# Patient Record
Sex: MALE | NOT HISPANIC OR LATINO | URBAN - METROPOLITAN AREA
[De-identification: names, ages, dates, MRNs, and addresses within clinical notes are randomized per-mention and may not be internally consistent; named-entity substitution may affect disease eponyms.]

---

## 2024-10-11 ENCOUNTER — APPOINTMENT (OUTPATIENT)
Dept: UROLOGY | Facility: CLINIC | Age: 69
End: 2024-10-11

## 2024-10-11 VITALS — TEMPERATURE: 97 F

## 2024-10-11 DIAGNOSIS — R39.12 BENIGN PROSTATIC HYPERPLASIA WITH WEAK URINARY STREAM: ICD-10-CM

## 2024-10-11 DIAGNOSIS — R97.20 ELEVATED PSA: Primary | ICD-10-CM

## 2024-10-11 DIAGNOSIS — N40.1 BENIGN PROSTATIC HYPERPLASIA WITH WEAK URINARY STREAM: ICD-10-CM

## 2024-10-11 PROCEDURE — 1036F TOBACCO NON-USER: CPT | Performed by: SURGERY

## 2024-10-11 PROCEDURE — 1159F MED LIST DOCD IN RCRD: CPT | Performed by: SURGERY

## 2024-10-11 PROCEDURE — 99203 OFFICE O/P NEW LOW 30 MIN: CPT | Performed by: SURGERY

## 2024-10-11 PROCEDURE — 1126F AMNT PAIN NOTED NONE PRSNT: CPT | Performed by: SURGERY

## 2024-10-11 PROCEDURE — 1160F RVW MEDS BY RX/DR IN RCRD: CPT | Performed by: SURGERY

## 2024-10-11 ASSESSMENT — PAIN SCALES - GENERAL: PAINLEVEL: 0-NO PAIN

## 2024-10-11 NOTE — PROGRESS NOTES
Subjective   Patient ID: Rocky Mota is a 69 y.o. male who presents for elevated PSA.    HPI  He is well-known to me from my previous practice.  He has a history of an elevated PSA.  His PSA ranges from 3-4.  He does have some lower urinary symptoms.  His AUA symptom score is 15.  He has no family history of prostate cancer.  He denies any kacy hematuria.  He denies any weight loss.      Review of Systems  As per HPI  Arthritis          Objective   Physical Exam  Well nourished  Breathing comfortably  Abdomen soft, ND, NT          Assessment/Plan   Problem List Items Addressed This Visit    None  Visit Diagnoses         Codes    Elevated PSA    -  Primary R97.20    Relevant Orders    Prostate Specific Antigen    Follow Up In Urology    Benign prostatic hyperplasia with weak urinary stream     N40.1, R39.12             1. Elevated PSA.  His most recent PSA is 3.4.  This is stable.  At this point I would repeat his PSA in 1 year.    2. BPH.  He has mild symptoms.  He is currently not bothered.  For now he will monitor his voiding.          Elenita Moyer MD 10/11/24 10:51 AM

## 2025-07-01 ENCOUNTER — TELEPHONE (OUTPATIENT)
Dept: UROLOGY | Facility: CLINIC | Age: 70
End: 2025-07-01
Payer: COMMERCIAL

## 2025-10-24 ENCOUNTER — APPOINTMENT (OUTPATIENT)
Dept: UROLOGY | Facility: CLINIC | Age: 70
End: 2025-10-24
Payer: COMMERCIAL